# Patient Record
Sex: MALE | Race: BLACK OR AFRICAN AMERICAN | Employment: FULL TIME | URBAN - METROPOLITAN AREA
[De-identification: names, ages, dates, MRNs, and addresses within clinical notes are randomized per-mention and may not be internally consistent; named-entity substitution may affect disease eponyms.]

---

## 2017-03-28 ENCOUNTER — HOSPITAL ENCOUNTER (EMERGENCY)
Facility: HOSPITAL | Age: 42
Discharge: HOME | End: 2017-03-28
Attending: EMERGENCY MEDICINE
Payer: COMMERCIAL

## 2017-03-28 ENCOUNTER — APPOINTMENT (OUTPATIENT)
Dept: GENERAL RADIOLOGY | Facility: HOSPITAL | Age: 42
End: 2017-03-28
Attending: EMERGENCY MEDICINE
Payer: COMMERCIAL

## 2017-03-28 ENCOUNTER — HOSPITAL ENCOUNTER (OUTPATIENT)
Age: 42
Discharge: ANOTHER HOSPITAL | End: 2017-03-28
Attending: FAMILY MEDICINE
Payer: COMMERCIAL

## 2017-03-28 ENCOUNTER — APPOINTMENT (OUTPATIENT)
Dept: CV DIAGNOSTICS | Facility: HOSPITAL | Age: 42
End: 2017-03-28
Attending: EMERGENCY MEDICINE
Payer: COMMERCIAL

## 2017-03-28 VITALS
HEART RATE: 85 BPM | WEIGHT: 306 LBS | SYSTOLIC BLOOD PRESSURE: 139 MMHG | BODY MASS INDEX: 42.84 KG/M2 | TEMPERATURE: 99 F | DIASTOLIC BLOOD PRESSURE: 84 MMHG | HEIGHT: 71 IN | OXYGEN SATURATION: 98 % | RESPIRATION RATE: 18 BRPM

## 2017-03-28 VITALS
SYSTOLIC BLOOD PRESSURE: 129 MMHG | TEMPERATURE: 97 F | DIASTOLIC BLOOD PRESSURE: 74 MMHG | BODY MASS INDEX: 42.84 KG/M2 | RESPIRATION RATE: 22 BRPM | HEART RATE: 91 BPM | WEIGHT: 306 LBS | HEIGHT: 71 IN | OXYGEN SATURATION: 100 %

## 2017-03-28 DIAGNOSIS — H81.10 VERTIGO, BENIGN POSITIONAL, UNSPECIFIED LATERALITY: Primary | ICD-10-CM

## 2017-03-28 DIAGNOSIS — R42 DIZZINESS: ICD-10-CM

## 2017-03-28 DIAGNOSIS — R06.02 SHORTNESS OF BREATH: ICD-10-CM

## 2017-03-28 DIAGNOSIS — R07.89 CHEST PAIN, NON-CARDIAC: ICD-10-CM

## 2017-03-28 DIAGNOSIS — R07.9 ACUTE CHEST PAIN: Primary | ICD-10-CM

## 2017-03-28 LAB
ALBUMIN SERPL-MCNC: 4.1 G/DL (ref 3.5–4.8)
ALP LIVER SERPL-CCNC: 89 U/L (ref 45–117)
ALT SERPL-CCNC: 39 U/L (ref 17–63)
APTT PPP: 35.7 SECONDS (ref 25–34)
AST SERPL-CCNC: 37 U/L (ref 15–41)
ATRIAL RATE: 70 BPM
ATRIAL RATE: 73 BPM
BILIRUB SERPL-MCNC: 0.7 MG/DL (ref 0.1–2)
BUN BLD-MCNC: 13 MG/DL (ref 8–20)
CALCIUM BLD-MCNC: 8.9 MG/DL (ref 8.3–10.3)
CHLORIDE: 107 MMOL/L (ref 101–111)
CO2: 27 MMOL/L (ref 22–32)
CREAT BLD-MCNC: 0.82 MG/DL (ref 0.7–1.3)
GLUCOSE BLD-MCNC: 86 MG/DL (ref 70–99)
INR BLD: 1.12 (ref 0.89–1.11)
M PROTEIN MFR SERPL ELPH: 7.5 G/DL (ref 6.1–8.3)
P AXIS: 50 DEGREES
P AXIS: 58 DEGREES
P-R INTERVAL: 156 MS
P-R INTERVAL: 156 MS
POTASSIUM SERPL-SCNC: 4.2 MMOL/L (ref 3.6–5.1)
PSA SERPL DL<=0.01 NG/ML-MCNC: 14.5 SECONDS (ref 12–14.3)
Q-T INTERVAL: 408 MS
Q-T INTERVAL: 408 MS
QRS DURATION: 90 MS
QRS DURATION: 90 MS
QTC CALCULATION (BEZET): 440 MS
QTC CALCULATION (BEZET): 449 MS
R AXIS: -2 DEGREES
R AXIS: 0 DEGREES
SODIUM SERPL-SCNC: 140 MMOL/L (ref 136–144)
T AXIS: -10 DEGREES
T AXIS: -10 DEGREES
TROPONIN: <0.046 NG/ML (ref ?–0.05)
VENTRICULAR RATE: 70 BPM
VENTRICULAR RATE: 73 BPM

## 2017-03-28 PROCEDURE — 99205 OFFICE O/P NEW HI 60 MIN: CPT

## 2017-03-28 PROCEDURE — 93005 ELECTROCARDIOGRAM TRACING: CPT

## 2017-03-28 PROCEDURE — 85610 PROTHROMBIN TIME: CPT

## 2017-03-28 PROCEDURE — 80053 COMPREHEN METABOLIC PANEL: CPT

## 2017-03-28 PROCEDURE — 71020 XR CHEST PA + LAT CHEST (CPT=71020): CPT | Performed by: EMERGENCY MEDICINE

## 2017-03-28 PROCEDURE — 84484 ASSAY OF TROPONIN QUANT: CPT

## 2017-03-28 PROCEDURE — 93010 ELECTROCARDIOGRAM REPORT: CPT | Performed by: INTERNAL MEDICINE

## 2017-03-28 PROCEDURE — 93350 STRESS TTE ONLY: CPT | Performed by: INTERNAL MEDICINE

## 2017-03-28 PROCEDURE — 93017 CV STRESS TEST TRACING ONLY: CPT

## 2017-03-28 PROCEDURE — 99285 EMERGENCY DEPT VISIT HI MDM: CPT

## 2017-03-28 PROCEDURE — 93010 ELECTROCARDIOGRAM REPORT: CPT

## 2017-03-28 PROCEDURE — 36415 COLL VENOUS BLD VENIPUNCTURE: CPT

## 2017-03-28 PROCEDURE — 85730 THROMBOPLASTIN TIME PARTIAL: CPT

## 2017-03-28 PROCEDURE — 93350 STRESS TTE ONLY: CPT

## 2017-03-28 PROCEDURE — 93018 CV STRESS TEST I&R ONLY: CPT | Performed by: INTERNAL MEDICINE

## 2017-03-28 RX ORDER — ASPIRIN 81 MG/1
324 TABLET, CHEWABLE ORAL ONCE
Status: COMPLETED | OUTPATIENT
Start: 2017-03-28 | End: 2017-03-28

## 2017-03-28 RX ORDER — MECLIZINE HYDROCHLORIDE 25 MG/1
25 TABLET ORAL ONCE
Status: COMPLETED | OUTPATIENT
Start: 2017-03-28 | End: 2017-03-28

## 2017-03-28 RX ORDER — MECLIZINE HYDROCHLORIDE 25 MG/1
25 TABLET ORAL 3 TIMES DAILY PRN
Qty: 20 TABLET | Refills: 0 | Status: SHIPPED | OUTPATIENT
Start: 2017-03-28 | End: 2017-10-09 | Stop reason: ALTCHOICE

## 2017-03-28 NOTE — ED PROVIDER NOTES
Patient Seen in: [facility]    History   Patient presents with:  Chest Pain Angina (cardiovascular)    Stated Complaint: sob chest pain dizziness today    HPI    79-year-old male with history of hypertension and obesity pre above.    PSFH elements reviewed from today and agreed except as otherwise stated in HPI.     Physical Exam     ED Triage Vitals   BP 03/28/17 1040 126/69 mmHg   Pulse 03/28/17 1040 83   Resp 03/28/17 1040 18   Temp 03/28/17 1040 97.1 °F (36.2 °C)   Temp sr 325 mg p.o. ×1 will be given. A peripheral IV line established. 9 1 was called. The patient will be transported to THE [facility] ER for further treatment. ED will be notified about patient's arrival.  Patient discharged from clinic in stable condition.     Dis

## 2017-03-28 NOTE — ED PROVIDER NOTES
Patient Seen in: [facility] Emergency Department    History   Patient presents with:  Chest Pain Angina (cardiovascular)    Stated Complaint: chest pain    HPI    44-year-old male complaining of chest pain the patient states this morning at 4 AM he Alcohol Use: No                Review of Systems    Positive for stated complaint: chest pain  Other systems are as noted in HPI. Constitutional and vital signs reviewed. All other systems reviewed and negative except as noted above.     PSFH elements this appears consistent with early repolarization does not appear to be a STEMI this is an abnormal EKG the rate axis and interval reviewed    MDM   IV was started labs drawn labs are unremarkable the EKG was reviewed with cardiology do not feel this is a

## 2017-03-28 NOTE — ED INITIAL ASSESSMENT (HPI)
Pt was working out yesterday and then at about 0400 pt developed chest pain over the left side, non radiating, pt states he vomited and was warm, but not diaphoretic. Pt states that he was also experiencing dizziness. Pt skin parameters wnl at this time.  H

## 2017-03-28 NOTE — PROGRESS NOTES
Preliminary Stress Echo  No ECG changes. No ectopy/arrhythmia noted. Pt c/o mild dizziness at rest that lessened 2 minutes into exercise. Pt denied chest pain throughout test.  Echo pending.

## 2018-01-08 PROCEDURE — 81003 URINALYSIS AUTO W/O SCOPE: CPT | Performed by: INTERNAL MEDICINE

## 2018-05-02 PROCEDURE — 84436 ASSAY OF TOTAL THYROXINE: CPT | Performed by: INTERNAL MEDICINE

## 2018-05-21 PROBLEM — K76.0 NAFLD (NONALCOHOLIC FATTY LIVER DISEASE): Status: ACTIVE | Noted: 2018-05-21

## 2018-07-03 PROBLEM — M22.42 CHONDROMALACIA OF LEFT PATELLOFEMORAL JOINT: Status: ACTIVE | Noted: 2018-07-03

## 2018-09-20 PROBLEM — S83.232A COMPLEX TEAR OF MEDIAL MENISCUS OF LEFT KNEE AS CURRENT INJURY: Status: ACTIVE | Noted: 2018-09-20

## 2018-09-20 PROBLEM — M17.12 PRIMARY OSTEOARTHRITIS OF LEFT KNEE: Status: ACTIVE | Noted: 2018-09-20

## 2019-05-01 PROBLEM — Z47.89 ORTHOPEDIC AFTERCARE: Status: ACTIVE | Noted: 2019-05-01

## 2019-08-07 PROBLEM — M25.512 LEFT SHOULDER PAIN, UNSPECIFIED CHRONICITY: Status: ACTIVE | Noted: 2019-08-07

## 2020-04-27 PROBLEM — I10 ESSENTIAL HYPERTENSION: Status: ACTIVE | Noted: 2020-04-27

## 2020-04-29 PROBLEM — R73.03 PREDIABETES: Status: ACTIVE | Noted: 2020-04-29

## 2020-04-29 PROBLEM — E16.1 HYPERINSULINEMIA: Status: ACTIVE | Noted: 2020-04-29

## 2021-04-05 PROBLEM — M25.512 LEFT SHOULDER PAIN, UNSPECIFIED CHRONICITY: Status: RESOLVED | Noted: 2019-08-07 | Resolved: 2021-04-05

## 2021-06-21 PROBLEM — E88.819 INSULIN RESISTANCE: Status: ACTIVE | Noted: 2021-06-21

## 2021-06-21 PROBLEM — E88.81 INSULIN RESISTANCE: Status: ACTIVE | Noted: 2021-06-21

## 2021-06-21 PROBLEM — E78.6 LOW HDL (UNDER 40): Status: ACTIVE | Noted: 2021-06-21

## 2021-06-21 PROBLEM — E66.01 OBESITY, CLASS III, BMI 40-49.9 (MORBID OBESITY) (HCC): Status: ACTIVE | Noted: 2021-06-21

## 2021-08-09 ENCOUNTER — HOSPITAL ENCOUNTER (OUTPATIENT)
Age: 46
Discharge: HOME | End: 2021-08-09
Payer: COMMERCIAL

## 2021-08-09 VITALS
SYSTOLIC BLOOD PRESSURE: 141 MMHG | WEIGHT: 315 LBS | TEMPERATURE: 97 F | DIASTOLIC BLOOD PRESSURE: 86 MMHG | HEART RATE: 78 BPM | OXYGEN SATURATION: 98 % | HEIGHT: 72 IN | RESPIRATION RATE: 20 BRPM | BODY MASS INDEX: 42.66 KG/M2

## 2021-08-09 DIAGNOSIS — Z20.822 ENCOUNTER FOR LABORATORY TESTING FOR COVID-19 VIRUS: Primary | ICD-10-CM

## 2021-08-09 DIAGNOSIS — Z20.822 LAB TEST NEGATIVE FOR COVID-19 VIRUS: ICD-10-CM

## 2021-08-09 LAB
S PYO AG THROAT QL: NEGATIVE
SARS-COV-2 RNA RESP QL NAA+PROBE: NOT DETECTED

## 2021-08-09 PROCEDURE — 99213 OFFICE O/P EST LOW 20 MIN: CPT | Performed by: NURSE PRACTITIONER

## 2021-08-09 PROCEDURE — U0002 COVID-19 LAB TEST NON-CDC: HCPCS | Performed by: NURSE PRACTITIONER

## 2021-08-09 PROCEDURE — 87880 STREP A ASSAY W/OPTIC: CPT | Performed by: NURSE PRACTITIONER

## 2021-08-09 NOTE — ED PROVIDER NOTES
Patient presents with:  Testing      HPI:     Zoie Mancera is a 42year old male who presents for a Covid test.  He states he has a mild dry cough and is scratchy throat. No difficulty breathing. No chest pain. No fevers.   He is eating and drinking n Frequency of Social Gatherings with Friends and Family:       Attends Ca[employer] Services:       Active Member of Clubs or Organizations:       Attends Club or Organization Meetings:       Marital Status:   Intimate Partner Violence:       Fear of Current o supportive care and follow-up with his primary care doctor. His symptoms are most likely viral.    Diagnosis:    ICD-10-CM    1. Encounter for laboratory testing for COVID-19 virus  Z20.822    2.  Lab test negative for COVID-19 virus  Z20.822        All re

## 2021-08-27 ENCOUNTER — HOSPITAL ENCOUNTER (OUTPATIENT)
Age: 46
Discharge: HOME | End: 2021-08-27
Attending: PHYSICIAN ASSISTANT
Payer: COMMERCIAL

## 2021-08-27 VITALS
SYSTOLIC BLOOD PRESSURE: 144 MMHG | RESPIRATION RATE: 20 BRPM | HEIGHT: 72 IN | HEART RATE: 104 BPM | BODY MASS INDEX: 42.66 KG/M2 | OXYGEN SATURATION: 98 % | DIASTOLIC BLOOD PRESSURE: 65 MMHG | WEIGHT: 315 LBS | TEMPERATURE: 98 F

## 2021-08-27 DIAGNOSIS — R05.9 COUGH: Primary | ICD-10-CM

## 2021-08-27 DIAGNOSIS — R03.0 ELEVATED BLOOD PRESSURE READING: ICD-10-CM

## 2021-08-27 DIAGNOSIS — Z20.822 ENCOUNTER FOR LABORATORY TESTING FOR COVID-19 VIRUS: ICD-10-CM

## 2021-08-27 LAB — SARS-COV-2 RNA RESP QL NAA+PROBE: NOT DETECTED

## 2021-08-27 PROCEDURE — U0002 COVID-19 LAB TEST NON-CDC: HCPCS | Performed by: PHYSICIAN ASSISTANT

## 2021-08-27 PROCEDURE — 99202 OFFICE O/P NEW SF 15 MIN: CPT | Performed by: PHYSICIAN ASSISTANT

## 2021-08-27 NOTE — ED INITIAL ASSESSMENT (HPI)
Pt presents to the IC with c/o a cough, productive with clear phlegm. No fevers, nasal congestion or sore throat.

## 2021-08-27 NOTE — ED PROVIDER NOTES
Patient Seen in: [facility]    History   Patient presents with:  Covid-19 Test    Stated Complaint: covid test    HPI    Tyree [place] is a 42year old male who presents with chief complaint of cough. Onset 3 to 4 days ago.   Patient denie Systems    Positive for stated complaint: covid test  Other systems are as noted in HPI. Constitutional and vital signs reviewed. All other systems reviewed and negative except as noted above.     PSFH elements reviewed from today and agreed except as Patient exhibits normal speech. Skin: Skin is normal to inspection. Warm and dry. No obvious bruising. No obvious rash.     ED Course     Labs Reviewed   RAPID SARS-COV-2 BY PCR - Normal       MDM     Physical exam remained stable over serial reexaminat pressure.     Medications Prescribed:  Current Discharge Medication List

## 2023-04-25 ENCOUNTER — NURSE ONLY (OUTPATIENT)
Dept: LAB | Facility: HOSPITAL | Age: 48
End: 2023-04-25
Attending: ORTHOPAEDIC SURGERY
Payer: COMMERCIAL

## 2023-04-25 DIAGNOSIS — Z01.818 PRE-OP TESTING: ICD-10-CM

## 2023-04-25 LAB
ANION GAP SERPL CALC-SCNC: 8 MMOL/L (ref 0–18)
ATRIAL RATE: 75 BPM
BUN BLD-MCNC: 16 MG/DL (ref 7–18)
BUN/CREAT SERPL: 18.8 (ref 10–20)
CALCIUM BLD-MCNC: 9.1 MG/DL (ref 8.5–10.1)
CHLORIDE SERPL-SCNC: 111 MMOL/L (ref 98–112)
CO2 SERPL-SCNC: 26 MMOL/L (ref 21–32)
CREAT BLD-MCNC: 0.85 MG/DL
FASTING STATUS PATIENT QL REPORTED: YES
GFR SERPLBLD BASED ON 1.73 SQ M-ARVRAT: 109 ML/MIN/1.73M2 (ref 60–?)
GLUCOSE BLD-MCNC: 95 MG/DL (ref 70–99)
OSMOLALITY SERPL CALC.SUM OF ELEC: 301 MOSM/KG (ref 275–295)
P AXIS: 57 DEGREES
P-R INTERVAL: 168 MS
POTASSIUM SERPL-SCNC: 3.8 MMOL/L (ref 3.5–5.1)
Q-T INTERVAL: 384 MS
QRS DURATION: 90 MS
QTC CALCULATION (BEZET): 428 MS
R AXIS: 4 DEGREES
SODIUM SERPL-SCNC: 145 MMOL/L (ref 136–145)
T AXIS: -15 DEGREES
VENTRICULAR RATE: 75 BPM

## 2023-04-25 PROCEDURE — 93005 ELECTROCARDIOGRAM TRACING: CPT

## 2023-04-25 PROCEDURE — 80048 BASIC METABOLIC PNL TOTAL CA: CPT

## 2023-04-25 PROCEDURE — 93010 ELECTROCARDIOGRAM REPORT: CPT | Performed by: INTERNAL MEDICINE

## 2023-04-25 PROCEDURE — 36415 COLL VENOUS BLD VENIPUNCTURE: CPT

## 2023-05-02 ENCOUNTER — ANESTHESIA EVENT (OUTPATIENT)
Dept: SURGERY | Facility: HOSPITAL | Age: 48
End: 2023-05-02
Payer: COMMERCIAL

## 2023-05-02 ENCOUNTER — HOSPITAL ENCOUNTER (OUTPATIENT)
Facility: HOSPITAL | Age: 48
Setting detail: HOSPITAL OUTPATIENT SURGERY
Discharge: HOME | End: 2023-05-02
Attending: ORTHOPAEDIC SURGERY | Admitting: ORTHOPAEDIC SURGERY
Payer: COMMERCIAL

## 2023-05-02 ENCOUNTER — ANESTHESIA (OUTPATIENT)
Dept: SURGERY | Facility: HOSPITAL | Age: 48
End: 2023-05-02
Payer: COMMERCIAL

## 2023-05-02 VITALS
DIASTOLIC BLOOD PRESSURE: 90 MMHG | TEMPERATURE: 97 F | OXYGEN SATURATION: 100 % | BODY MASS INDEX: 42.66 KG/M2 | WEIGHT: 315 LBS | HEIGHT: 72 IN | HEART RATE: 76 BPM | SYSTOLIC BLOOD PRESSURE: 158 MMHG | RESPIRATION RATE: 18 BRPM

## 2023-05-02 DIAGNOSIS — Z01.818 PRE-OP TESTING: Primary | ICD-10-CM

## 2023-05-02 PROBLEM — S83.231A COMPLEX TEAR OF MEDIAL MENISCUS OF RIGHT KNEE AS CURRENT INJURY: Status: ACTIVE | Noted: 2018-09-20

## 2023-05-02 PROBLEM — S83.241A ACUTE MEDIAL MENISCUS TEAR OF RIGHT KNEE: Status: ACTIVE | Noted: 2018-09-20

## 2023-05-02 LAB — GLUCOSE BLD-MCNC: 71 MG/DL (ref 70–99)

## 2023-05-02 PROCEDURE — 0SBC4ZZ EXCISION OF RIGHT KNEE JOINT, PERCUTANEOUS ENDOSCOPIC APPROACH: ICD-10-PCS | Performed by: ORTHOPAEDIC SURGERY

## 2023-05-02 PROCEDURE — 82962 GLUCOSE BLOOD TEST: CPT

## 2023-05-02 RX ORDER — ACETAMINOPHEN 500 MG
1000 TABLET ORAL ONCE AS NEEDED
Status: COMPLETED | OUTPATIENT
Start: 2023-05-02 | End: 2023-05-02

## 2023-05-02 RX ORDER — ONDANSETRON 2 MG/ML
4 INJECTION INTRAMUSCULAR; INTRAVENOUS EVERY 6 HOURS PRN
Status: DISCONTINUED | OUTPATIENT
Start: 2023-05-02 | End: 2023-05-02

## 2023-05-02 RX ORDER — KETOROLAC TROMETHAMINE 30 MG/ML
INJECTION, SOLUTION INTRAMUSCULAR; INTRAVENOUS AS NEEDED
Status: DISCONTINUED | OUTPATIENT
Start: 2023-05-02 | End: 2023-05-02 | Stop reason: SURG

## 2023-05-02 RX ORDER — HYDROMORPHONE HYDROCHLORIDE 1 MG/ML
INJECTION, SOLUTION INTRAMUSCULAR; INTRAVENOUS; SUBCUTANEOUS
Status: COMPLETED
Start: 2023-05-02 | End: 2023-05-02

## 2023-05-02 RX ORDER — HYDROMORPHONE HYDROCHLORIDE 1 MG/ML
0.2 INJECTION, SOLUTION INTRAMUSCULAR; INTRAVENOUS; SUBCUTANEOUS EVERY 5 MIN PRN
Status: DISCONTINUED | OUTPATIENT
Start: 2023-05-02 | End: 2023-05-02

## 2023-05-02 RX ORDER — HYDROCODONE BITARTRATE AND ACETAMINOPHEN 5; 325 MG/1; MG/1
1 TABLET ORAL ONCE AS NEEDED
Status: COMPLETED | OUTPATIENT
Start: 2023-05-02 | End: 2023-05-02

## 2023-05-02 RX ORDER — HYDROMORPHONE HYDROCHLORIDE 1 MG/ML
0.6 INJECTION, SOLUTION INTRAMUSCULAR; INTRAVENOUS; SUBCUTANEOUS EVERY 5 MIN PRN
Status: DISCONTINUED | OUTPATIENT
Start: 2023-05-02 | End: 2023-05-02

## 2023-05-02 RX ORDER — CEFAZOLIN SODIUM IN 0.9 % NACL 3 G/100 ML
3 INTRAVENOUS SOLUTION, PIGGYBACK (ML) INTRAVENOUS ONCE
Status: DISCONTINUED | OUTPATIENT
Start: 2023-05-02 | End: 2023-05-02 | Stop reason: HOSPADM

## 2023-05-02 RX ORDER — PROCHLORPERAZINE EDISYLATE 5 MG/ML
5 INJECTION INTRAMUSCULAR; INTRAVENOUS EVERY 8 HOURS PRN
Status: DISCONTINUED | OUTPATIENT
Start: 2023-05-02 | End: 2023-05-02

## 2023-05-02 RX ORDER — ACETAMINOPHEN 500 MG
1000 TABLET ORAL ONCE
Status: DISCONTINUED | OUTPATIENT
Start: 2023-05-02 | End: 2023-05-02 | Stop reason: HOSPADM

## 2023-05-02 RX ORDER — SODIUM CHLORIDE, SODIUM LACTATE, POTASSIUM CHLORIDE, CALCIUM CHLORIDE 600; 310; 30; 20 MG/100ML; MG/100ML; MG/100ML; MG/100ML
INJECTION, SOLUTION INTRAVENOUS CONTINUOUS
Status: DISCONTINUED | OUTPATIENT
Start: 2023-05-02 | End: 2023-05-02

## 2023-05-02 RX ORDER — BUPIVACAINE HYDROCHLORIDE AND EPINEPHRINE 5; 5 MG/ML; UG/ML
INJECTION, SOLUTION EPIDURAL; INTRACAUDAL; PERINEURAL AS NEEDED
Status: DISCONTINUED | OUTPATIENT
Start: 2023-05-02 | End: 2023-05-02

## 2023-05-02 RX ORDER — HYDROMORPHONE HYDROCHLORIDE 1 MG/ML
0.4 INJECTION, SOLUTION INTRAMUSCULAR; INTRAVENOUS; SUBCUTANEOUS EVERY 5 MIN PRN
Status: DISCONTINUED | OUTPATIENT
Start: 2023-05-02 | End: 2023-05-02

## 2023-05-02 RX ORDER — NALOXONE HYDROCHLORIDE 0.4 MG/ML
80 INJECTION, SOLUTION INTRAMUSCULAR; INTRAVENOUS; SUBCUTANEOUS AS NEEDED
Status: DISCONTINUED | OUTPATIENT
Start: 2023-05-02 | End: 2023-05-02

## 2023-05-02 RX ORDER — DEXAMETHASONE SODIUM PHOSPHATE 4 MG/ML
VIAL (ML) INJECTION AS NEEDED
Status: DISCONTINUED | OUTPATIENT
Start: 2023-05-02 | End: 2023-05-02 | Stop reason: SURG

## 2023-05-02 RX ORDER — HYDROCODONE BITARTRATE AND ACETAMINOPHEN 5; 325 MG/1; MG/1
2 TABLET ORAL ONCE AS NEEDED
Status: COMPLETED | OUTPATIENT
Start: 2023-05-02 | End: 2023-05-02

## 2023-05-02 RX ORDER — ONDANSETRON 2 MG/ML
INJECTION INTRAMUSCULAR; INTRAVENOUS AS NEEDED
Status: DISCONTINUED | OUTPATIENT
Start: 2023-05-02 | End: 2023-05-02 | Stop reason: SURG

## 2023-05-02 RX ORDER — MEPERIDINE HYDROCHLORIDE 25 MG/ML
12.5 INJECTION INTRAMUSCULAR; INTRAVENOUS; SUBCUTANEOUS AS NEEDED
Status: DISCONTINUED | OUTPATIENT
Start: 2023-05-02 | End: 2023-05-02

## 2023-05-02 RX ORDER — LABETALOL HYDROCHLORIDE 5 MG/ML
5 INJECTION, SOLUTION INTRAVENOUS EVERY 5 MIN PRN
Status: DISCONTINUED | OUTPATIENT
Start: 2023-05-02 | End: 2023-05-02

## 2023-05-02 RX ORDER — LABETALOL HYDROCHLORIDE 5 MG/ML
10 INJECTION, SOLUTION INTRAVENOUS ONCE
Status: DISCONTINUED | OUTPATIENT
Start: 2023-05-02 | End: 2023-05-02

## 2023-05-02 RX ORDER — SCOLOPAMINE TRANSDERMAL SYSTEM 1 MG/1
1 PATCH, EXTENDED RELEASE TRANSDERMAL ONCE
Status: DISCONTINUED | OUTPATIENT
Start: 2023-05-02 | End: 2023-05-02 | Stop reason: HOSPADM

## 2023-05-02 RX ORDER — LIDOCAINE HYDROCHLORIDE 10 MG/ML
INJECTION, SOLUTION EPIDURAL; INFILTRATION; INTRACAUDAL; PERINEURAL AS NEEDED
Status: DISCONTINUED | OUTPATIENT
Start: 2023-05-02 | End: 2023-05-02 | Stop reason: SURG

## 2023-05-02 RX ORDER — MIDAZOLAM HYDROCHLORIDE 1 MG/ML
1 INJECTION INTRAMUSCULAR; INTRAVENOUS EVERY 5 MIN PRN
Status: DISCONTINUED | OUTPATIENT
Start: 2023-05-02 | End: 2023-05-02

## 2023-05-02 RX ORDER — LABETALOL HYDROCHLORIDE 5 MG/ML
INJECTION, SOLUTION INTRAVENOUS AS NEEDED
Status: DISCONTINUED | OUTPATIENT
Start: 2023-05-02 | End: 2023-05-02 | Stop reason: SURG

## 2023-05-02 RX ADMIN — SODIUM CHLORIDE, SODIUM LACTATE, POTASSIUM CHLORIDE, CALCIUM CHLORIDE: 600; 310; 30; 20 INJECTION, SOLUTION INTRAVENOUS at 15:38:00

## 2023-05-02 RX ADMIN — LABETALOL HYDROCHLORIDE 5 MG: 5 INJECTION, SOLUTION INTRAVENOUS at 16:00:00

## 2023-05-02 RX ADMIN — DEXAMETHASONE SODIUM PHOSPHATE 4 MG: 4 MG/ML VIAL (ML) INJECTION at 15:52:00

## 2023-05-02 RX ADMIN — ONDANSETRON 4 MG: 2 INJECTION INTRAMUSCULAR; INTRAVENOUS at 15:57:00

## 2023-05-02 RX ADMIN — KETOROLAC TROMETHAMINE 30 MG: 30 INJECTION, SOLUTION INTRAMUSCULAR; INTRAVENOUS at 16:04:00

## 2023-05-02 RX ADMIN — LIDOCAINE HYDROCHLORIDE 50 MG: 10 INJECTION, SOLUTION EPIDURAL; INFILTRATION; INTRACAUDAL; PERINEURAL at 15:42:00

## 2023-05-02 NOTE — BRIEF OP NOTE
Pre-Operative Diagnosis: RIGHT MEDIAL MENISCUS TEAR     Post-Operative Diagnosis: RIGHT MEDIAL MENISCUS TEAR      Procedure Performed:   ARTHROSCOPY WITH PARTIAL MEDIAL MENISCECTOMY RIGHT KNEE    Surgeon(s) and Role:     * Frantz Centeno MD - Primary    Assistant(s):  KEVIN: KEVIN Peacock     Surgical Findings: above     Specimen: none     Estimated Blood Loss: Blood Output: 5 mL (5/2/2023  4:06 PM)      Sheyla Beasley MD  5/2/2023  4:12 PM

## 2023-05-02 NOTE — DISCHARGE INSTRUCTIONS
Home Care Instructions  ARTHROSCOPY KNEE SURGERY #1  Dr Smith Daily. [place]      Diet:   Eat only light, non greasy foods today. Medications:  A prescription for a pain medication was sent into your pharmacy at your preoperative appointment. You should take the pain medication with food. You may substitute Tylenol for your pain medicine as desired. You may take Advil or Aleve with your pain medication. Please inform us of any known drug allergies. An ice bag should be applied to your knee for 24 hours. Elevate the surgical leg for 24-48 hours to help reduce swelling and pain, then as needed. Dressing: The dressing may be removed after 24 hours. Cover the small incisions with Band-Aid and reapply ACE wrap. Wash hands thoroughly before changing dressing. DO NOT apply antiseptic ointment or medicine to incision. No sutures are present, some oozing is expected. You may shower 24 hours after surgery. Change Band-aids after your daily shower. Until your first postoperative check in the office: do not swim, take tub baths, or use a whirlpool. Activities: You may bend you knee as much as the dressing will allow. An ice bag should be applied to your knee consistently for the first 24 hours and intermittently for the first week. Elevate the surgical leg for 24-48 hours to help reduce swelling and pain. Practice quadriceps muscles tightening and straight leg raising exercises several times each hour. Use crutches or a cane for security and comfort as needed. Please discard these as soon as possible, unless asked to use them for a longer period of time. Let pain be your guide to activity, too much pain = too much activity. Rest, ice, elevate operative leg above level of your heart and take pain medication as needed. Engage only in simple walking activities for 10 days. No sports, no running, no excessive stair climbing, or squatting  Swelling may be present postoperatively.  Restrict activity if swelling is present and elevate your leg above the level of your heart. Do not use exercise machines unless specified. Generally, if you have a job with little physical activity you may return on the third postoperative day  If your job requires considerable standing, lifting or walking, It is generally a good idea to take the first week off. Call the [facility] office at [phone] or the [address] [place] [address] office at [phone]  If you develop any fever (101 degrees or above), unexpected redness or swelling in your leg. If your toes become cold, purple, numb or there is excessive bleeding. Call the day after surgery to obtain a follow-up appointment for 5-10 days after surgery, if not  already scheduled.        You have been given a prescription for Glean Socks was Given to you at:6:15 pm  Next dose due:  12:15 am  Take this medication as directed  This medication contains Tylenol (acetaminophen)  Do not take additional Tylenol while taking Дмитрий Douglas is a Narcotic and can be constipating or upset your stomach  Don't take Norco on an empty stomach  Drink plenty of water  Alcoholic beverages should be avoided while taking narcotics

## 2023-05-02 NOTE — ANESTHESIA POSTPROCEDURE EVALUATION
Reyna De Richelle 58 Patient Status:  Hospital Outpatient Surgery   Age/Gender 41year old male MRN [id number]   [facility] SURGERY Attending Horacio Mitchell MD   Hosp Day # 0 PCP Deny Arteaga MD       Anesthesia Post-op Note    ARTHROSCOPY WITH PARTIAL MEDIAL MENISCECTOMY RIGHT KNEE    Procedure Summary     Date: 05/02/23 Room / Location: [facility] MAIN OR 14 / [facility] MAIN OR    Anesthesia Start: 8550 Anesthesia Stop: 6605    Procedure: ARTHROSCOPY WITH PARTIAL MEDIAL MENISCECTOMY RIGHT KNEE (Right: Knee) Diagnosis: (RIGHT MEDIAL MENISCUS TEAR)    Surgeons: Horacio Mitchell MD Anesthesiologist: Sarah Adam DO    Anesthesia Type: general ASA Status: 3          Anesthesia Type: general    Vitals Value Taken Time   /104 05/02/23 1616   Temp 97.1 05/02/23 1616   Pulse 77 05/02/23 1616   Resp 15 05/02/23 1616   SpO2 94 05/02/23 1616       Patient Location: PACU    Anesthesia Type: general    Airway Patency: patent    Postop Pain Control: adequate    Mental Status: preanesthetic baseline    Nausea/Vomiting: none    Cardiopulmonary/Hydration status: stable euvolemic    Complications: no apparent anesthesia related complications    Postop vital signs: stable    Dental Exam: Unchanged from Preop    Patient to be discharged from PACU when criteria met.

## 2023-05-02 NOTE — ANESTHESIA PROCEDURE NOTES
Airway  Date/Time: 5/2/2023 3:43 PM  Urgency: elective      General Information and Staff    Patient location during procedure: OR  Anesthesiologist: Sidney Mejia DO  Performed: CRNA   Performed by: Fabby Fragoso CRNA  Authorized by: Fabby Fragoso CRNA      Indications and Patient Condition  Indications for airway management: anesthesia  Sedation level: deep  Preoxygenated: yes  Patient position: sniffing  Mask difficulty assessment: 1 - vent by mask    Final Airway Details  Final airway type: supraglottic airway      Successful airway: classic  Size 4       Number of attempts at approach: 1  Number of other approaches attempted: 0

## 2023-05-03 NOTE — OPERATIVE REPORT
[facility]    PATIENT'S NAME: Treva Barth   ATTENDING PHYSICIAN: Sheri Engle M.D. OPERATING PHYSICIAN: Sheri Engle M.D. PATIENT ACCOUNT#:   [de-identified]    LOCATION:  [place] [address]  MEDICAL RECORD #:   [id number]       YOB: 1975  ADMISSION DATE:       05/02/2023      OPERATION DATE:  05/02/2023    OPERATIVE REPORT      PREOPERATIVE DIAGNOSIS:  Medial meniscus tear, right knee. POSTOPERATIVE DIAGNOSIS:  Medial meniscus tear, right knee. PROCEDURE:  Arthroscopy, partial medial meniscectomy, right knee. MODE OF ANESTHETIC:  General.    ESTIMATED BLOOD LOSS:  5 mL. FINDINGS:  Include above, grade 2 change medial compartment, grade 2 change patellofemoral.     OPERATIVE TECHNIQUE:  Taken to the operating room, placed on the operating room table in supine position. Balanced anesthetic is carried out. High-thigh tourniquet is applied, elevated without compression. Sterile prep and drape, antibiotic and time-out. Superomedial arthroscopic inflow portal, inferolateral arthroscopic viewing portal.  Suprapatellar pouch is clear. Patella tracks nicely. Inferomedial portal is established. Fat pad is debrided. ACL is intact. The medial compartment is visualized. Diffuse grade 2 change is seen. There is a parrot beak tear of the posterior horn medial meniscus with horizontal cleavage component. With basket forceps and motorized shaver, a partial medial meniscectomy is carried out. Friable tissue is removed. The meniscal rim is stabilized. Loose bodies are evacuated. Lateral compartment is visualized, seen to be without disease. Gutters are checked for loose bodies. Meniscectomy is checked for completeness. Then, 0.5% Marcaine is injected in the portals. Sterile compressive dressing is applied. Transferred to recovery room awake and stable.     Dictated By Sheri Engle M.D.  d: 05/02/2023 16:14:52  t: 05/02/2023 [id number]  [facility] [id number]  MA/

## 2023-09-09 ENCOUNTER — HOSPITAL ENCOUNTER (OUTPATIENT)
Age: 48
Discharge: HOME | End: 2023-09-09
Payer: COMMERCIAL

## 2023-09-09 VITALS
HEART RATE: 82 BPM | TEMPERATURE: 98 F | OXYGEN SATURATION: 96 % | SYSTOLIC BLOOD PRESSURE: 131 MMHG | DIASTOLIC BLOOD PRESSURE: 79 MMHG | RESPIRATION RATE: 20 BRPM

## 2023-09-09 DIAGNOSIS — B34.9 VIRAL SYNDROME: Primary | ICD-10-CM

## 2023-09-09 LAB — SARS-COV-2 RNA RESP QL NAA+PROBE: NOT DETECTED

## 2023-09-09 PROCEDURE — U0002 COVID-19 LAB TEST NON-CDC: HCPCS | Performed by: NURSE PRACTITIONER

## 2023-09-09 PROCEDURE — 99213 OFFICE O/P EST LOW 20 MIN: CPT | Performed by: NURSE PRACTITIONER

## 2023-09-09 NOTE — DISCHARGE INSTRUCTIONS
Tylenol and Motrin as needed. Take Mucinex as needed for congestion and cough. Increase oral fluids. Go to the emergency room with any worsening symptoms.

## 2023-09-09 NOTE — ED INITIAL ASSESSMENT (HPI)
Pt c/o fatigue, nausea, dizziness and intermittent cough.  Pt's wife had a covid positive per home test.

## 2024-07-01 ENCOUNTER — HOSPITAL ENCOUNTER (EMERGENCY)
Facility: HOSPITAL | Age: 49
Discharge: HOME | End: 2024-07-01
Attending: EMERGENCY MEDICINE
Payer: COMMERCIAL

## 2024-07-01 ENCOUNTER — APPOINTMENT (OUTPATIENT)
Dept: CT IMAGING | Facility: HOSPITAL | Age: 49
End: 2024-07-01
Attending: EMERGENCY MEDICINE
Payer: COMMERCIAL

## 2024-07-01 VITALS
WEIGHT: 315 LBS | HEART RATE: 78 BPM | RESPIRATION RATE: 15 BRPM | TEMPERATURE: 98 F | HEIGHT: 72 IN | BODY MASS INDEX: 42.66 KG/M2 | DIASTOLIC BLOOD PRESSURE: 91 MMHG | SYSTOLIC BLOOD PRESSURE: 159 MMHG | OXYGEN SATURATION: 98 %

## 2024-07-01 DIAGNOSIS — N20.1 RIGHT URETERAL CALCULUS: Primary | ICD-10-CM

## 2024-07-01 LAB
ALBUMIN SERPL-MCNC: 3.8 G/DL (ref 3.4–5)
ALBUMIN/GLOB SERPL: 1 {RATIO} (ref 1–2)
ALP LIVER SERPL-CCNC: 105 U/L
ALT SERPL-CCNC: 46 U/L
ANION GAP SERPL CALC-SCNC: 7 MMOL/L (ref 0–18)
AST SERPL-CCNC: 19 U/L (ref 15–37)
BASOPHILS # BLD AUTO: 0.04 X10(3) UL (ref 0–0.2)
BASOPHILS NFR BLD AUTO: 0.3 %
BILIRUB SERPL-MCNC: 0.5 MG/DL (ref 0.1–2)
BILIRUB UR QL STRIP.AUTO: NEGATIVE
BUN BLD-MCNC: 17 MG/DL (ref 9–23)
CALCIUM BLD-MCNC: 8.9 MG/DL (ref 8.5–10.1)
CHLORIDE SERPL-SCNC: 106 MMOL/L (ref 98–112)
CLARITY UR REFRACT.AUTO: CLEAR
CO2 SERPL-SCNC: 25 MMOL/L (ref 21–32)
CREAT BLD-MCNC: 1.48 MG/DL
EGFRCR SERPLBLD CKD-EPI 2021: 58 ML/MIN/1.73M2 (ref 60–?)
EOSINOPHIL # BLD AUTO: 0.04 X10(3) UL (ref 0–0.7)
EOSINOPHIL NFR BLD AUTO: 0.3 %
ERYTHROCYTE [DISTWIDTH] IN BLOOD BY AUTOMATED COUNT: 14 %
GLOBULIN PLAS-MCNC: 3.8 G/DL (ref 2.8–4.4)
GLUCOSE BLD-MCNC: 79 MG/DL (ref 70–99)
GLUCOSE UR STRIP.AUTO-MCNC: NORMAL MG/DL
HCT VFR BLD AUTO: 39.9 %
HGB BLD-MCNC: 13.3 G/DL
IMM GRANULOCYTES # BLD AUTO: 0.11 X10(3) UL (ref 0–1)
IMM GRANULOCYTES NFR BLD: 0.9 %
KETONES UR STRIP.AUTO-MCNC: NEGATIVE MG/DL
LEUKOCYTE ESTERASE UR QL STRIP.AUTO: NEGATIVE
LIPASE SERPL-CCNC: 15 U/L (ref 13–75)
LYMPHOCYTES # BLD AUTO: 1.83 X10(3) UL (ref 1–4)
LYMPHOCYTES NFR BLD AUTO: 14.4 %
MCH RBC QN AUTO: 29.6 PG (ref 26–34)
MCHC RBC AUTO-ENTMCNC: 33.3 G/DL (ref 31–37)
MCV RBC AUTO: 88.9 FL
MONOCYTES # BLD AUTO: 1.25 X10(3) UL (ref 0.1–1)
MONOCYTES NFR BLD AUTO: 9.8 %
NEUTROPHILS # BLD AUTO: 9.45 X10 (3) UL (ref 1.5–7.7)
NEUTROPHILS # BLD AUTO: 9.45 X10(3) UL (ref 1.5–7.7)
NEUTROPHILS NFR BLD AUTO: 74.3 %
NITRITE UR QL STRIP.AUTO: NEGATIVE
OSMOLALITY SERPL CALC.SUM OF ELEC: 286 MOSM/KG (ref 275–295)
PH UR STRIP.AUTO: 5.5 [PH] (ref 5–8)
PLATELET # BLD AUTO: 341 10(3)UL (ref 150–450)
POTASSIUM SERPL-SCNC: 3.7 MMOL/L (ref 3.5–5.1)
PROT SERPL-MCNC: 7.6 G/DL (ref 6.4–8.2)
PROT UR STRIP.AUTO-MCNC: NEGATIVE MG/DL
RBC # BLD AUTO: 4.49 X10(6)UL
RBC UR QL AUTO: NEGATIVE
SODIUM SERPL-SCNC: 138 MMOL/L (ref 136–145)
SP GR UR STRIP.AUTO: 1.02 (ref 1–1.03)
UROBILINOGEN UR STRIP.AUTO-MCNC: NORMAL MG/DL
WBC # BLD AUTO: 12.7 X10(3) UL (ref 4–11)

## 2024-07-01 PROCEDURE — 80053 COMPREHEN METABOLIC PANEL: CPT | Performed by: EMERGENCY MEDICINE

## 2024-07-01 PROCEDURE — 96374 THER/PROPH/DIAG INJ IV PUSH: CPT

## 2024-07-01 PROCEDURE — 99284 EMERGENCY DEPT VISIT MOD MDM: CPT

## 2024-07-01 PROCEDURE — 96361 HYDRATE IV INFUSION ADD-ON: CPT

## 2024-07-01 PROCEDURE — 99285 EMERGENCY DEPT VISIT HI MDM: CPT

## 2024-07-01 PROCEDURE — 85025 COMPLETE CBC W/AUTO DIFF WBC: CPT | Performed by: EMERGENCY MEDICINE

## 2024-07-01 PROCEDURE — 83690 ASSAY OF LIPASE: CPT | Performed by: EMERGENCY MEDICINE

## 2024-07-01 PROCEDURE — 81003 URINALYSIS AUTO W/O SCOPE: CPT | Performed by: EMERGENCY MEDICINE

## 2024-07-01 PROCEDURE — 74177 CT ABD & PELVIS W/CONTRAST: CPT | Performed by: EMERGENCY MEDICINE

## 2024-07-01 PROCEDURE — 96375 TX/PRO/DX INJ NEW DRUG ADDON: CPT

## 2024-07-01 RX ORDER — HYDROCODONE BITARTRATE AND ACETAMINOPHEN 5; 325 MG/1; MG/1
1 TABLET ORAL EVERY 6 HOURS PRN
Qty: 10 TABLET | Refills: 0 | Status: SHIPPED | OUTPATIENT
Start: 2024-07-01 | End: 2024-07-06

## 2024-07-01 RX ORDER — TIRZEPATIDE 2.5 MG/.5ML
2.5 INJECTION, SOLUTION SUBCUTANEOUS WEEKLY
COMMUNITY
Start: 2024-04-15

## 2024-07-01 RX ORDER — CYCLOBENZAPRINE HCL 10 MG
1 TABLET ORAL 3 TIMES DAILY PRN
COMMUNITY
Start: 2024-06-28 | End: 2024-07-05

## 2024-07-01 RX ORDER — ONDANSETRON 2 MG/ML
4 INJECTION INTRAMUSCULAR; INTRAVENOUS ONCE
Status: COMPLETED | OUTPATIENT
Start: 2024-07-01 | End: 2024-07-01

## 2024-07-01 RX ORDER — DIPHENHYDRAMINE HYDROCHLORIDE 50 MG/ML
25 INJECTION INTRAMUSCULAR; INTRAVENOUS ONCE
Status: DISCONTINUED | OUTPATIENT
Start: 2024-07-01 | End: 2024-07-01

## 2024-07-01 RX ORDER — TAMSULOSIN HYDROCHLORIDE 0.4 MG/1
0.4 CAPSULE ORAL DAILY
Qty: 7 CAPSULE | Refills: 0 | Status: SHIPPED | OUTPATIENT
Start: 2024-07-01 | End: 2024-07-08

## 2024-07-01 RX ORDER — ONDANSETRON 4 MG/1
4 TABLET, ORALLY DISINTEGRATING ORAL EVERY 4 HOURS PRN
Qty: 10 TABLET | Refills: 0 | Status: SHIPPED | OUTPATIENT
Start: 2024-07-01 | End: 2024-07-08

## 2024-07-01 RX ORDER — KETOROLAC TROMETHAMINE 15 MG/ML
15 INJECTION, SOLUTION INTRAMUSCULAR; INTRAVENOUS ONCE
Status: COMPLETED | OUTPATIENT
Start: 2024-07-01 | End: 2024-07-01

## 2024-07-01 RX ORDER — METOCLOPRAMIDE HYDROCHLORIDE 5 MG/ML
10 INJECTION INTRAMUSCULAR; INTRAVENOUS ONCE
Status: DISCONTINUED | OUTPATIENT
Start: 2024-07-01 | End: 2024-07-01

## 2024-07-01 RX ORDER — NAPROXEN 500 MG/1
500 TABLET ORAL 2 TIMES DAILY PRN
Qty: 20 TABLET | Refills: 0 | Status: SHIPPED | OUTPATIENT
Start: 2024-07-01 | End: 2024-07-11

## 2024-07-01 RX ORDER — METHYLPREDNISOLONE 4 MG/1
1 TABLET ORAL AS DIRECTED
COMMUNITY
Start: 2024-06-28

## 2024-07-01 NOTE — ED PROVIDER NOTES
Patient Seen in: [facility] Emergency Department      History     Chief Complaint   Patient presents with    Abdomen/Flank Pain     Stated Complaint: SENT FROM IC, ADOMINAL PAIN, VOMITING    Subjective:   HPI    49-year-old male with a past medical history as below including hypertension, prediabetes and obesity presents from immediate care due to bilateral flank pain radiating to the lower abdomen.  Patient states he started having bilateral flank pain about a week ago.  He states pain lasted throughout the day but then resolved and returned about 4 days ago.  He states he went to immediate care and had an x-ray done and was prescribed steroids and muscle relaxants.  He states pain again had resolved up until last night when he returned with radiation to his lower abdomen.  He also was nauseated and had 1 episode of nonbloody/nonbilious emesis.  States pain is not as severe as it was last night but is still present.  He denies fever or chills.  Denies diarrhea, melena or rectal bleeding.  Denies urinary symptoms.    Objective:   Past Medical History:    COVID    fatigue chills    Prediabetes              Past Surgical History:   Procedure Laterality Date    Colonoscopy      Knee arthroscopy Right 2023    Other Left 2019    meniscus    Other surgical history      Other surgical history      scalp    Other surgical history      dental                Social History     Socioeconomic History    Marital status:    Tobacco Use    Smoking status: Never    Smokeless tobacco: Never   Vaping Use    Vaping status: Never Used   Substance and Sexual Activity    Alcohol use: No     Alcohol/week: 0.0 standard drinks of alcohol    Drug use: No    Sexual activity: Yes              Review of Systems    Positive for stated Chief Complaint: Abdomen/Flank Pain    Other systems are as noted in HPI.  Constitutional and vital signs reviewed.      All other systems reviewed and negative except as noted above.    Physical Exam      ED Triage Vitals   BP 07/01/24 1116 (!) 165/96   Pulse 07/01/24 1116 90   Resp 07/01/24 1116 17   Temp 07/01/24 1116 97.9 °F (36.6 °C)   Temp src 07/01/24 1116 Temporal   SpO2 07/01/24 1116 99 %   O2 Device 07/01/24 1200 None (Room air)       Current Vitals:   Vital Signs  BP: (!) 159/91  Pulse: 78  Resp: 15  Temp: 97.9 °F (36.6 °C)  Temp src: Temporal  MAP (mmHg): (!) 112    Oxygen Therapy  SpO2: 98 %  O2 Device: None (Room air)            Physical Exam  Vitals and nursing note reviewed.   Constitutional:       General: He is not in acute distress.     Appearance: He is well-developed. He is obese. He is not ill-appearing.   HENT:      Head: Normocephalic and atraumatic.      Mouth/Throat:      Mouth: Mucous membranes are moist.   Eyes:      General: No scleral icterus.     Extraocular Movements: Extraocular movements intact.   Cardiovascular:      Rate and Rhythm: Normal rate and regular rhythm.   Pulmonary:      Effort: Pulmonary effort is normal.      Breath sounds: Normal breath sounds.   Abdominal:      General: Bowel sounds are normal.      Palpations: Abdomen is soft.      Tenderness: There is right CVA tenderness and left CVA tenderness.      Comments: Mild diffuse nonfocal tenderness  Bilateral CVA and paraspinal lumbar tenderness   Musculoskeletal:      Cervical back: Neck supple.   Skin:     General: Skin is warm and dry.      Capillary Refill: Capillary refill takes less than 2 seconds.   Neurological:      Mental Status: He is alert and oriented to person, place, and time.      GCS: GCS eye subscore is 4. GCS verbal subscore is 5. GCS motor subscore is 6.   Psychiatric:         Mood and Affect: Mood normal.         Behavior: Behavior normal.               ED Course     Labs Reviewed   COMP METABOLIC PANEL (14) - Abnormal; Notable for the following components:       Result Value    Creatinine 1.48 (*)     eGFR-Cr 58 (*)     All other components within normal limits   CBC W/ DIFFERENTIAL - Abnormal;  Notable for the following components:    WBC 12.7 (*)     Neutrophil Absolute Prelim 9.45 (*)     Neutrophil Absolute 9.45 (*)     Monocyte Absolute 1.25 (*)     All other components within normal limits   LIPASE - Normal   URINALYSIS, ROUTINE   CBC WITH DIFFERENTIAL WITH PLATELET    Narrative:     The following orders were created for panel order CBC With Differential With Platelet.  Procedure                               Abnormality         Status                     ---------                               -----------         ------                     CBC W/ DIFFERENTIAL[[id number]]          Abnormal            Final result                 Please view results for these tests on the individual orders.   RAINBOW DRAW LAVENDER   RAINBOW DRAW LIGHT GREEN             CT ABDOMEN+PELVIS(CONTRAST ONLY)(CPT=74177)    Result Date: 7/1/2024  PROCEDURE:  CT ABDOMEN+PELVIS (CONTRAST ONLY) (CPT=74177)  COMPARISON:  None.  INDICATIONS:  bilateral flank pain radiaitng to lower abd  TECHNIQUE:  CT scanning was performed from the dome of the diaphragm to the pubic symphysis with non-ionic intravenous contrast material. Post contrast coronal MPR imaging was performed.  Dose reduction techniques were used. Dose information is transmitted to the ACR (American College of Radiology) NRDR (National Radiology Data Registry) which includes the Dose Index Registry.  PATIENT STATED HISTORY:(As transcribed by Technologist)  Patient is here for bilat flank pain radiating into lower abdomen.   CONTRAST USED:  100cc of Isovue 370  FINDINGS:    LIVER:  Fatty liver.  BILIARY:  Unremarkable.  PANCREAS:  Unremarkable.  SPLEEN:  Unremarkable.  KIDNEYS:  Obstructing stone proximal right ureter 4 x 5 mm size.  Mild right hydronephrosis, and differential enhancement of the right kidney diminished compared to the left kidney, and mild perinephric stranding.  Left kidney appears without obstruction.  ADRENALS:  Unremarkable.  AORTA/VASCULAR:  No aortic  aneurysm.  RETROPERITONEUM:  Unremarkable.  BOWEL/MESENTERY:  No acute process.  ABDOMINAL WALL:  Unremarkable.  URINARY BLADDER:  Limited assessment of the urinary bladder which contains only a small amount of urine at the time of scanning.  The bladder wall appears mildly thickened, which can be a consequence of the under distended state, but can also be seen with cystitis.  Correlate for any potential signs, symptoms or laboratory studies, for potential cystitis.   LYMPH NODES PELVIS:  Unremarkable.  PELVIC ORGANS:  No acute process.  LUNG BASES:  No acute process.  BONES:  No acute abnormality.              CONCLUSION:  Obstructing stone proximal right ureter with mild hydronephrosis, obstructive nephrogram, and mild perinephric stranding.   LOCATION:  [facility]   Dictated by (CST): Brandon Monroy MD on 7/01/2024 at 2:03 PM     Finalized by (CST): Brandon Monroy MD on 7/01/2024 at 2:05 PM               MDM   49-year-old male with a past medical history as below including hypertension, prediabetes and obesity presents from immediate care due to bilateral flank pain radiating to the lower abdomen.      Differential includes but is not limited to ureteral calculus, pyelonephritis, musculoskeletal back pain, less likely appendicitis, diverticulitis pancreatitis    Labs show mildly elevated WBC 12.7 without left shift.  Creatinine is mildly elevated 1.48 with normal BUN and electrolytes.  UA without evidence of UTI.    Independent interpretation of CT abdomen/pelvis shows no obstructing stone in the right ureter.  Radiology report reviewed as above measuring the stone at 4 x 5 mm with mild hydro-.    Pain controlled with Toradol.  Will DC home with Rx for Flomax, naproxen, Norco and Zofran.  Instructed to follow-up with urology for further outpatient management.  Return precautions discussed.        Medical Decision Making  Amount and/or Complexity of Data Reviewed  Labs: ordered. Decision-making details documented in  ED Course.  Radiology: ordered and independent interpretation performed. Decision-making details documented in ED Course.    Risk  Prescription drug management.        Disposition and Plan     Clinical Impression:  1. Right ureteral calculus         Disposition:  Discharge  7/1/2024  3:29 pm    Follow-up:  Maida Orourke MD  [address]  [address]  [facility] [address]  [phone]    Schedule an appointment as soon as possible for a visit      Daniel Germain MD  [address]  [facility] [address]  [phone]    Schedule an appointment as soon as possible for a visit            Medications Prescribed:  Current Discharge Medication List        START taking these medications    Details   naproxen 500 MG Oral Tab Take 1 tablet (500 mg total) by mouth 2 (two) times daily as needed.  Qty: 20 tablet, Refills: 0      HYDROcodone-acetaminophen 5-325 MG Oral Tab Take 1 tablet by mouth every 6 (six) hours as needed.  Qty: 10 tablet, Refills: 0    Associated Diagnoses: Right ureteral calculus      ondansetron 4 MG Oral Tablet Dispersible Take 1 tablet (4 mg total) by mouth every 4 (four) hours as needed for Nausea.  Qty: 10 tablet, Refills: 0      tamsulosin (FLOMAX) 0.4 MG Oral Cap Take 1 capsule (0.4 mg total) by mouth daily for 7 days.  Qty: 7 capsule, Refills: 0

## 2024-07-01 NOTE — ED INITIAL ASSESSMENT (HPI)
PT FROM [facility] D/T INTERMITTENT ABD PAIN. Pt reports bilateral flank pain since last Sunday. Pt reports taking mounjaro for the past 6 weeks. Pt reports intermittent nausea/vomitng.

## 2024-07-03 ENCOUNTER — OFFICE VISIT (OUTPATIENT)
Dept: SURGERY | Facility: CLINIC | Age: 49
End: 2024-07-03
Payer: COMMERCIAL

## 2024-07-03 VITALS — HEIGHT: 72 IN | BODY MASS INDEX: 45 KG/M2

## 2024-07-03 DIAGNOSIS — N20.1 RIGHT URETERAL STONE: Primary | ICD-10-CM

## 2024-07-03 DIAGNOSIS — N13.2 URETERAL STONE WITH HYDRONEPHROSIS: ICD-10-CM

## 2024-07-03 PROCEDURE — 99244 OFF/OP CNSLTJ NEW/EST MOD 40: CPT | Performed by: UROLOGY

## 2024-07-03 NOTE — PROGRESS NOTES
[facility] Urology  Initial Office Consultation    HPI:   David Garces is a 49 year old male here today for consultation at the request of, and a copy of this note will be sent to, Maida Orourke MD. He is accompanied by his wife.    1.  Right ureteral stone with hydronephrosis  Patient presented to the [place] ER on 7/1/2024 with bilateral flank pain radiating to the lower abdomen.    Patient started about a week prior but then resolved.  It recurred and associated with an episode of vomiting and nausea.  No fevers or chills.  No gross hematuria or dysuria.    No previous history of nephrolithiasis.    CT abdomen and pelvis completed and revealed a 4 x 5 mm right proximal ureteral stone with mild upstream hydronephrosis.    WBC count was 12,000.  He had mild LINDA with a BUN and creatinine of 17 and 1.48.  Urinalysis was negative.    Pain was controlled in the ER and he was discharged home with a trial of medical expulsive therapy.    He presents for further evaluation and management.  He reports his pain has been intermittent and mild at a 1-2 out of 10.  He has been taking pain medication as needed.  He has been straining his urine and has not noticed passing the stone.      PAST MEDICAL HISTORY: Obesity.  Hypertension.  Prediabetes.    PAST SURGICAL HISTORY: Knee scope    SOCIAL HISTORY:  and has no children.  No smoking or illicit drug use.  No alcohol.  Works for the [employer].     Family History   Problem Relation Age of Onset    Diabetes Father     Cancer Paternal Grandmother         colon    High Blood Pressure Paternal Grandfather      Allergies: Seasonal      REVIEW OF SYSTEMS:  Pertinent positives and negatives per HPI. A 12-point ROS was performed and is otherwise negative.       EXAM:  Ht 6' (1.829 m)   BMI 44.76 kg/m²     Physical Exam  Constitutional:       General: He is not in acute distress.     Appearance: He is well-developed. He is obese.   HENT:      Head:  Normocephalic.   Eyes:      General: No scleral icterus.  Cardiovascular:      Rate and Rhythm: Normal rate.   Pulmonary:      Effort: Pulmonary effort is normal.   Abdominal:      General: There is no distension.      Palpations: Abdomen is soft.      Tenderness: There is no abdominal tenderness. There is no right CVA tenderness or left CVA tenderness.   Genitourinary:     Penis: Circumcised.    Skin:     General: Skin is warm and dry.   Neurological:      Mental Status: He is alert and oriented to person, place, and time.   Psychiatric:         Mood and Affect: Mood normal.         Behavior: Behavior normal.       LABS:  See HPI for details.      IMAGING:    CT ABDOMEN+PELVIS(CONTRAST ONLY) which I personally reviewed.  I agree with final radiology report revealing:    KIDNEYS:  Obstructing stone proximal right ureter 4 x 5 mm size.  Mild right hydronephrosis, and differential enhancement of the right kidney diminished compared to the left kidney, and mild perinephric stranding.  Left kidney appears without obstruction.     CONCLUSION:  Obstructing stone proximal right ureter with mild hydronephrosis, obstructive nephrogram, and mild perinephric stranding.        IMPRESSION:  49 year old male with a 5 mm right proximal ureteral stone diagnosed upon ER evaluation of flank pain.    Patient seems to be minimally symptomatic and his pain is well-controlled.  No symptoms of infection.    Different stone management options were discussed including continued trial of medical expulsive therapy, ESWL, or ureteroscopy with laser lithotripsy.     Rationale and approach as well as benefits, risks, possible complications and reasonable alternatives of each treatment were discussed with the patient.  Stone passage rates with medical expulsive therapy as well as clearance rates with surgery were discussed.  We also discussed the expected postoperative course of recovery.     We discussed risks of surgery including but not  limited to medical and anesthetic complications, bleeding, infection, damage to surrounding organs or structures, ureteral injury, stricture formation, and need for additional procedures.     Patient verbalized understanding. All questions were answered.    Patient would like to continue trial of medical expulsive therapy.  Signs and symptoms requiring presentation to the ER were discussed with the patient.      PLAN:  1.  Continue trial of medical expulsive therapy.  Patient will strain his urine and let us know if he passes the stone.    2.  Obtain follow-up ultrasound of the kidneys in 1 to 2 weeks as well as BMP to ensure resolution of LINDA.    Patient will call us back if he decides to proceed with definitive surgical treatment and/or fails medical expulsive therapy.    Jose Muniz MD  7/3/2024

## 2024-07-18 ENCOUNTER — HOSPITAL ENCOUNTER (OUTPATIENT)
Dept: ULTRASOUND IMAGING | Age: 49
Discharge: HOME | End: 2024-07-18
Attending: UROLOGY
Payer: COMMERCIAL

## 2024-07-18 DIAGNOSIS — N20.1 RIGHT URETERAL STONE: ICD-10-CM

## 2024-07-18 DIAGNOSIS — N13.2 URETERAL STONE WITH HYDRONEPHROSIS: ICD-10-CM

## 2024-07-18 PROCEDURE — 76775 US EXAM ABDO BACK WALL LIM: CPT | Performed by: UROLOGY

## 2024-07-22 ENCOUNTER — LAB ENCOUNTER (OUTPATIENT)
Dept: LAB | Facility: HOSPITAL | Age: 49
End: 2024-07-22
Attending: UROLOGY
Payer: COMMERCIAL

## 2024-07-22 DIAGNOSIS — N13.2 URETERAL STONE WITH HYDRONEPHROSIS: ICD-10-CM

## 2024-07-22 DIAGNOSIS — N20.1 RIGHT URETERAL STONE: ICD-10-CM

## 2024-07-22 LAB
ANION GAP SERPL CALC-SCNC: 6 MMOL/L (ref 0–18)
BUN BLD-MCNC: 10 MG/DL (ref 9–23)
CALCIUM BLD-MCNC: 9.1 MG/DL (ref 8.7–10.4)
CHLORIDE SERPL-SCNC: 108 MMOL/L (ref 98–112)
CO2 SERPL-SCNC: 25 MMOL/L (ref 21–32)
CREAT BLD-MCNC: 0.87 MG/DL
EGFRCR SERPLBLD CKD-EPI 2021: 107 ML/MIN/1.73M2 (ref 60–?)
FASTING STATUS PATIENT QL REPORTED: YES
GLUCOSE BLD-MCNC: 98 MG/DL (ref 70–99)
OSMOLALITY SERPL CALC.SUM OF ELEC: 287 MOSM/KG (ref 275–295)
POTASSIUM SERPL-SCNC: 3.8 MMOL/L (ref 3.5–5.1)
SODIUM SERPL-SCNC: 139 MMOL/L (ref 136–145)

## 2024-07-22 PROCEDURE — 80048 BASIC METABOLIC PNL TOTAL CA: CPT

## 2024-07-22 PROCEDURE — 36415 COLL VENOUS BLD VENIPUNCTURE: CPT

## (undated) DEVICE — STERILE POLYISOPRENE POWDER-FREE SURGICAL GLOVES: Brand: PROTEXIS

## (undated) DEVICE — GAUZE,SPONGE,FLUFF,6"X6.75",STRL,5/TRAY: Brand: MEDLINE

## (undated) DEVICE — SLEEVE KENDALL SCD EXPRESS MED

## (undated) DEVICE — TUBING CYSTO TUR DUAL

## (undated) DEVICE — APPLICATOR CHLORAPREP 26ML

## (undated) DEVICE — KNEE ARTHROSCOPY CDS: Brand: MEDLINE INDUSTRIES, INC.

## (undated) DEVICE — PAD,ABDOMINAL,8"X7.5",ST,LF,20/BX: Brand: MEDLINE INDUSTRIES, INC.

## (undated) DEVICE — DISPOSABLE TOURNIQUET CUFF SINGLE BLADDER, DUAL PORT AND QUICK CONNECT CONNECTOR: Brand: COLOR CUFF

## (undated) DEVICE — SOL LACT RINGERS 3000ML

## (undated) DEVICE — BANDAGE,GAUZE,BULKEE II,4.5"X4.1YD,STRL: Brand: MEDLINE

## (undated) DEVICE — PADDING CAST COTTON  4

## (undated) DEVICE — [AGGRESSIVE PLUS CUTTER, ARTHROSCOPIC SHAVER BLADE,  DO NOT RESTERILIZE,  DO NOT USE IF PACKAGE IS DAMAGED,  KEEP DRY,  KEEP AWAY FROM SUNLIGHT]: Brand: FORMULA

## (undated) NOTE — ED AVS SNAPSHOT
[facility] Emergency Department    [place]  [address] [address]    Phone:  [phone]    Fax:  [phone]           Iverson Shone   MRN: [id number]    Department:  [facility] Emergency Department   Date of Visit:  3/28 IF THERE IS ANY CHANGE OR WORSENING OF YOUR CONDITION, CALL YOUR PRIMARY CARE PHYSICIAN AT ONCE OR RETURN IMMEDIATELY TO THE EMERGENCY DEPARTMENT.     If you have been prescribed any medication(s), please fill your prescription right away and begin taking t

## (undated) NOTE — ED AVS SNAPSHOT
[facility] Emergency Department    [facility]  [address] [address]    Phone:  [phone]    Fax:  [phone]           Iverson Shone   MRN: [id number]    Department:  [facility] Emergency Department   Date of Visit:  3/28 CHEST PAIN, NONCARDIAC  (ENGLISH)    VERTIGO, BENIGN PAROXYSMAL POSITIONAL (ENGLISH)      Disclosure     Insurance plans vary and the physician(s) referred by the ER may not be covered by your plan.  Please contact your insurance company to determine cover If you have been prescribed any medication(s), please fill your prescription right away and begin taking the medication(s) as directed    If the emergency physician has read X-rays, these will be re-interpreted by a radiologist.  If there is a significant can help with your Affordable Care Act coverage, as well as all types of Medicaid plans. To get signed up and covered, please call [phone] and ask to get set up for an insurance coverage that is in-network with [facility]